# Patient Record
Sex: FEMALE | Race: BLACK OR AFRICAN AMERICAN | NOT HISPANIC OR LATINO | Employment: UNEMPLOYED | URBAN - METROPOLITAN AREA
[De-identification: names, ages, dates, MRNs, and addresses within clinical notes are randomized per-mention and may not be internally consistent; named-entity substitution may affect disease eponyms.]

---

## 2022-08-03 ENCOUNTER — SOCIAL WORK (OUTPATIENT)
Dept: ADMINISTRATIVE | Facility: OTHER | Age: 27
End: 2022-08-03

## 2022-08-03 NOTE — PROGRESS NOTES
SW met with pt regarding initial OB assessment. Pt stated this is her 1st pregnancy/0-miscarriage. Pt stated lives with her family and able to perform ADL's independently. Pt stated does work. Pt stated support system is her boyfriend/Deandrew. Pt stated has medicaid([facility]). Pt stated does have WIC. Pt stated is going to breastfeed. SW provide pt with information on other community resources.SW faxed and scanned pt's notification of pregnancy into epic.  No other needs identified at this time.    Francheska Aranda,MSW  Pager#[phone]

## 2022-08-15 PROBLEM — Z78.9 NOT IMMUNE TO RUBELLA: Status: ACTIVE | Noted: 2022-08-15

## 2022-09-13 PROBLEM — O28.3 FETAL ECHOGENIC INTRACARDIAC FOCUS ON PRENATAL ULTRASOUND: Status: ACTIVE | Noted: 2022-09-13

## 2022-10-15 PROBLEM — O46.90 VAGINAL BLEEDING DURING PREGNANCY: Status: ACTIVE | Noted: 2022-10-15

## 2022-10-15 PROBLEM — F31.9 BIPOLAR DISORDER: Status: ACTIVE | Noted: 2022-10-15

## 2022-10-15 PROBLEM — Z34.02 ENCOUNTER FOR SUPERVISION OF NORMAL FIRST PREGNANCY IN SECOND TRIMESTER: Status: ACTIVE | Noted: 2022-10-15

## 2023-06-13 ENCOUNTER — PATIENT MESSAGE (OUTPATIENT)
Dept: RESEARCH | Facility: HOSPITAL | Age: 28
End: 2023-06-13

## 2023-06-20 ENCOUNTER — PATIENT MESSAGE (OUTPATIENT)
Dept: RESEARCH | Facility: HOSPITAL | Age: 28
End: 2023-06-20